# Patient Record
(demographics unavailable — no encounter records)

---

## 2025-01-08 NOTE — HISTORY OF PRESENT ILLNESS
[Patient reported mammogram was normal] : Patient reported mammogram was normal [Patient reported PAP Smear was normal] : Patient reported PAP Smear was normal [N] : Patient does not use contraception [Y] : Positive pregnancy history [Normal Amount/Duration] :  normal amount and duration [Regular Cycle Intervals] : periods have been regular [Frequency: Q ___ days] : menstrual periods occur approximately every [unfilled] days [Menarche Age: ____] : age at menarche was [unfilled] [Currently Active] : currently active [Men] : men [No] : No [Gonorrhea test offered] : Gonorrhea test offered [Chlamydia test offered] : Chlamydia test offered [Trichomonas test offered] : Trichomonas test offered [HPV test offered] : HPV test offered [TextBox_4] : 34-year-old para 1-0-1-1 with LMP 10/26/2024 came for annual GYN exam and Pap smear and to evaluate pregnancy from home pregnancy test positive.  Patient denies abnormal uterine bleeding, pelvic pain, discharge, dysuria or other GYN complaints.  Patient states she has been tolerating food and having increased appetite but denies any pain or adverse complications since LMP.  She has history of abnormal Pap but high risk HPV negative.  She also has a remote history of HSV but no recent outbreaks.  She denies other STDs, fibroids or ovarian cyst.  She is sexually active with 1 male partner-.  UCG positive U dip: Bilirubin, ketones, microscopic hematuria, protein 30  FOB: Same father as her current living child and her most recent pregnancy that resulted in SAB without testing.  He is healthy and then you have no significant family history of genetic disease. [Mammogramdate] : 7/2024 [PapSmeardate] : 3/2022 [LMPDate] : 10/26/24 [MensesFreq] : 28 [MensesLength] : 5-7 [MensesAmount] : mod [PGHxTotal] : 2 [[facility]erm] : 1 [PGHxPremature] : 0 [PGHxAbortions] : 1 [[facility] Centeriving] : 1 [PGHxABSpont] : 1 [FreeTextEntry1] : 10/26/24

## 2025-01-08 NOTE — HISTORY OF PRESENT ILLNESS
[Patient reported mammogram was normal] : Patient reported mammogram was normal [Patient reported PAP Smear was normal] : Patient reported PAP Smear was normal [N] : Patient does not use contraception [Y] : Positive pregnancy history [Normal Amount/Duration] :  normal amount and duration [Regular Cycle Intervals] : periods have been regular [Frequency: Q ___ days] : menstrual periods occur approximately every [unfilled] days [Menarche Age: ____] : age at menarche was [unfilled] [Currently Active] : currently active [Men] : men [No] : No [Gonorrhea test offered] : Gonorrhea test offered [Chlamydia test offered] : Chlamydia test offered [Trichomonas test offered] : Trichomonas test offered [HPV test offered] : HPV test offered [TextBox_4] : 34-year-old para 1-0-1-1 with LMP 10/26/2024 came for annual GYN exam and Pap smear and to evaluate pregnancy from home pregnancy test positive.  Patient denies abnormal uterine bleeding, pelvic pain, discharge, dysuria or other GYN complaints.  Patient states she has been tolerating food and having increased appetite but denies any pain or adverse complications since LMP.  She has history of abnormal Pap but high risk HPV negative.  She also has a remote history of HSV but no recent outbreaks.  She denies other STDs, fibroids or ovarian cyst.  She is sexually active with 1 male partner-.  UCG positive U dip: Bilirubin, ketones, microscopic hematuria, protein 30  FOB: Same father as her current living child and her most recent pregnancy that resulted in SAB without testing.  He is healthy and then you have no significant family history of genetic disease. [Mammogramdate] : 7/2024 [PapSmeardate] : 3/2022 [LMPDate] : 10/26/24 [MensesFreq] : 28 [MensesLength] : 5-7 [MensesAmount] : mod [PGHxTotal] : 2 [[facility] HospitalxFulerm] : 1 [PGHxPremature] : 0 [PGHxAbortions] : 1 [[facility] Centeriving] : 1 [PGHxABSpont] : 1 [FreeTextEntry1] : 10/26/24

## 2025-01-08 NOTE — COUNSELING
[Nutrition/ Exercise/ Weight Management] : nutrition, exercise, weight management [Vitamins/Supplements] : vitamins/supplements [Breast Self Exam] : breast self exam [Bladder Hygiene] : bladder hygiene [Contraception/ Emergency Contraception/ Safe Sexual Practices] : contraception, emergency contraception, safe sexual practices [Pregnancy Options] : pregnancy options [STD (testing, results, tx)] : STD (testing, results, tx) [Lab Results] : lab results [Medication Management] : medication management [Pre/Post Op Instructions] : pre/post op instructions

## 2025-01-08 NOTE — DISCUSSION/SUMMARY
[FreeTextEntry1] : Transvaginal ultrasound done to assess pregnancy viability and dating.  Patient and  were counseled that a gestational sac with questionable yolk sac was noted in the uterus but there was no fetal pole which would be expected with dating consistent with LMP.  There is no adnexal masses or findings or suggestion of ectopic and no free fluid.  Patient counseled on possible early IUP but concern for missed  and as she is asymptomatic recommend serial hCG and progesterone levels and will follow-up 48 hours after repeat labs for further management discussion and planning.  Strict pain and bleeding and infection precautions given.  Patient and  understood counseling and all questions answered satisfactorily.  A: 34-year-old for annual GYN exam, amenorrhea, incidental pregnancy-unknown viability, BMI 28  P: GYN exam done Pap smear done Transvaginal sonogram done to assess pregnancy viability-findings counseled hCG/progesterone and preop labs done Repeat labs in 48 hours Safe sex practices Pain, bleeding, and infection precautions counseled-patient knows recommendation for RhoGAM to be given if she starts bleeding due to Rh- status If recurrent SAB is diagnosed options of management were discussed including conservative, medical and surgical If recurrent SAB noted we discussed doing genetic testing for her and her  for future management planning Encourage healthy diet and activity as tolerated Follow-up after repeat labs or as needed

## 2025-01-08 NOTE — PHYSICAL EXAM
[Chaperone Declined] : Patient declined chaperone [Appropriately responsive] : appropriately responsive [Alert] : alert [No Acute Distress] : no acute distress [No Lymphadenopathy] : no lymphadenopathy [Regular Rate Rhythm] : regular rate rhythm [Soft] : soft [Non-tender] : non-tender [Non-distended] : non-distended [No Mass] : no mass [Oriented x3] : oriented x3 [Examination Of The Breasts] : a normal appearance [No Discharge] : no discharge [No Masses] : no breast masses were palpable [No Lesions] : no lesions  [Labia Majora] : normal [Labia Minora] : normal [Normal] : normal [No Bleeding] : There was no active vaginal bleeding [Uterine Adnexae] : normal [FreeTextEntry2] :  present for exam [Tenderness] : nontender [Enlarged ___ wks] : not enlarged [Mass ___ cm] : no uterine mass was palpated [FreeTextEntry5] : pap smear done Cervix closed and long

## 2025-01-13 NOTE — PLAN
[FreeTextEntry1] : TAUS: Sac like structure noted TVUS: intrauterine gestational sac visualized with multiple internal hyperechoic rings, with suspected failed twin pregnancy with possible single fetal pole without FH  A/P: 35yo with likely early embryonic demise.  - Given previous US and follow up bedside US today, likely early pregnancy loss discussed, however  given bedside US today with inconclusive findings, discussed recommendation for expectant management with repeat US in 1 week with early pregnancy loss precautions, vs official US today  - Patient agrees for official US today, order placed and US facilitated   ADDENDUM:  After US patient left clinic as she is scheduled for work before reviewing results with me. I called patient at the end of the day who affirmed that she discussed care with MFM team and primary Gyn, and affirmed she will follow up with primary gyn. Denies other questions or concerns at this time, patient may return to [facility] as needed.

## 2025-01-13 NOTE — PLAN
[FreeTextEntry1] : TAUS: Sac like structure noted TVUS: intrauterine gestational sac visualized with multiple internal hyperechoic rings, with suspected failed twin pregnancy with possible single fetal pole without FH  A/P: 35yo with likely early embryonic demise.  - Given previous US and follow up bedside US today, likely early pregnancy loss discussed, however  given bedside US today with inconclusive findings, discussed recommendation for expectant management with repeat US in 1 week with early pregnancy loss precautions, vs official US today  - Patient agrees for official US today, order placed and US facilitated   ADDENDUM:  After US patient left clinic as she is scheduled for work before reviewing results with me. I called patient at the end of the day who affirmed that she discussed care with MFM team and primary Gyn, and affirmed she will follow up with primary gyn. Denies other questions or concerns at this time, patient may return to [facility] as needed.

## 2025-01-13 NOTE — PHYSICAL EXAM
[Chaperone Present] : A chaperone was present in the examining room during all aspects of the physical examination [18100] : A chaperone was present during the pelvic exam. [Appropriately responsive] : appropriately responsive [Alert] : alert [No Acute Distress] : no acute distress [Soft] : soft [Non-tender] : non-tender [No Lesions] : no lesions [Oriented x3] : oriented x3 [Labia Majora] : normal [Normal] : normal [FreeTextEntry2] : CONCEPCIÓN Rangel

## 2025-01-13 NOTE — END OF VISIT
[FreeTextEntry3] :  I spent 40 minutes caring for this patient, including time spent before the visit reviewing the chart, time spent during the visit, and time spent after the visit on documentation, excluding all procedures including ultrasounds.  [Time Spent: ___ minutes] : I have spent [unfilled] minutes of time on the encounter which excludes teaching and separately reported services.

## 2025-01-13 NOTE — PHYSICAL EXAM
[Chaperone Present] : A chaperone was present in the examining room during all aspects of the physical examination [88821] : A chaperone was present during the pelvic exam. [Appropriately responsive] : appropriately responsive [Alert] : alert [No Acute Distress] : no acute distress [Soft] : soft [Non-tender] : non-tender [No Lesions] : no lesions [Oriented x3] : oriented x3 [Labia Majora] : normal [Normal] : normal [FreeTextEntry2] : CONCEPCIÓN Rangel

## 2025-01-13 NOTE — HISTORY OF PRESENT ILLNESS
[FreeTextEntry1] : Patient presenting today for evaluation for desired pregnancy with uncertain viability.  She reports that she was offered misoprostol for management of early pregnancy loss, but since she did not have symptoms of pregnancy loss, and continues to have symptoms of pregnancy, she desired expectant management and repeat imaging.   12/31/24  Hg 14.1 HCG 99976 1/3/25 HCG 46473

## 2025-01-13 NOTE — HISTORY OF PRESENT ILLNESS
[FreeTextEntry1] : Patient presenting today for evaluation for desired pregnancy with uncertain viability.  She reports that she was offered misoprostol for management of early pregnancy loss, but since she did not have symptoms of pregnancy loss, and continues to have symptoms of pregnancy, she desired expectant management and repeat imaging.   12/31/24  Hg 14.1 HCG 50491 1/3/25 HCG 75360

## 2025-01-23 NOTE — HISTORY OF PRESENT ILLNESS
[Normal Amount/Duration] :  normal amount and duration [Regular Cycle Intervals] : periods have been regular [Frequency: Q ___ days] : menstrual periods occur approximately every [unfilled] days [Menarche Age: ____] : age at menarche was [unfilled] [Currently Active] : currently active [Men] : men [No] : No [Patient reported PAP Smear was abnormal] : Patient reported PAP Smear was abnormal [TextBox_4] : 34-year-old para 1-0-2-1 status post suction D&C for missed AB came for postop exam.  Patient denies any residual vaginal bleeding or pelvic cramping and is feeling well.  Pathology was reviewed however still awaiting the Delphine results to evaluate fetal tissue for potential genetic anomaly.  Due to patient having to spontaneous abortions she would like hereditary carrier testing to evaluate for any possible abnormalities to explain miscarriages.  Patient counseled if she is positive for any carrier status it would be recommended to check her .  Patient would like to do testing today.  Patient was also counseled that her recent Pap smear showed high risk HPV positive however cells were negative and would recommend repeat Pap smear in 6 months.  Patient understood all counseling all questions answered satisfactorily and patient is amenable to management planning.  Preconception counseling was done and patient encouraged to have regular menstrual cycles before attempting pregnancy again.  Patient understands but declines any contraception. [PapSmeardate] : 12/2024 [TextBox_31] : High risk HPV positive, cells negative [FreeTextEntry1] : 10/26/24

## 2025-01-23 NOTE — DISCUSSION/SUMMARY
[FreeTextEntry1] : A: 34-year-old status post D&C for treatment of her missed , recurrent miscarriage,  high risk HPV, BMI 29  P: Postop exam done Results reviewed and discussed-awaiting fetal tissue genetic results Safe sex practices Pain and bleeding precautions Contraception counseling done-patient wants nothing at this time Procreative carrier gene test counseling done-[employer] panel sent Encourage healthy diet and exercise Follow-up 6 months for repeat Pap or as needed

## 2025-01-23 NOTE — COUNSELING
[Nutrition/ Exercise/ Weight Management] : nutrition, exercise, weight management [Vitamins/Supplements] : vitamins/supplements [Bladder Hygiene] : bladder hygiene [Contraception/ Emergency Contraception/ Safe Sexual Practices] : contraception, emergency contraception, safe sexual practices [Preconception Care/ Fertility options] : preconception care, fertility options [Lab Results] : lab results [Medication Management] : medication management

## 2025-01-23 NOTE — PHYSICAL EXAM
[Chaperone Declined] : Patient declined chaperone [Appropriately responsive] : appropriately responsive [Alert] : alert [No Acute Distress] : no acute distress [Regular Rate Rhythm] : regular rate rhythm [Soft] : soft [Non-tender] : non-tender [Non-distended] : non-distended [No Mass] : no mass [Oriented x3] : oriented x3 [No Lesions] : no lesions  [Labia Majora] : normal [Labia Minora] : normal [No Bleeding] : There was no active vaginal bleeding [Normal] : normal [Uterine Adnexae] : normal [Tenderness] : nontender [Enlarged ___ wks] : not enlarged [Mass ___ cm] : no uterine mass was palpated

## 2025-05-02 NOTE — HISTORY OF PRESENT ILLNESS
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 She denies any fevers at home, appears comfortably, speaking comfortably, no evident sob. Tried allergy meds at home w no releif

## 2025-05-02 NOTE — ASSESSMENT
[FreeTextEntry1] : #bronchitis likely viral R/O Mycoplasma pt has a 2 year old at home - biaxin 500 q12 x 10d  # HTN /108 Start HCTZ 25 mg po daily Low salt diet FU in 3 days

## 2025-05-02 NOTE — ADDENDUM
[FreeTextEntry1] : Patients BP taken twice : 145/106 and 148/108 Hx of elevated BPps. Not on meds. Has Occasional Headaches

## 2025-06-13 NOTE — HISTORY OF PRESENT ILLNESS
[Amenorrhea] : amenorrhea [Y] : Positive pregnancy history [Normal Amount/Duration] :  normal amount and duration [Regular Cycle Intervals] : periods have been regular [Frequency: Q ___ days] : menstrual periods occur approximately every [unfilled] days [Menarche Age: ____] : age at menarche was [unfilled] [HCG+: ___(Date)] : a positive HCG was recorded on [unfilled] [Currently Active] : currently active [Men] : men [No] : No [TextBox_4] : 35-year-old para 1-0-1-1 with LMP -4/26/25 came for annual GYN exam and Pap smear and to evaluate pregnancy from home pregnancy test positive. Patient denies abnormal uterine bleeding, pelvic pain, discharge, dysuria or other GYN complaints. Patient states she has been tolerating food and having increased appetite but denies any pain or adverse complications since LMP. She has history of abnormal Pap but high risk HPV negative. She also has a remote history of HSV but no recent outbreaks. She denies other STDs, fibroids or ovarian cyst. She is sexually active with 1 male partner-.  UCG positive [LMPDate] : 4/26/25 [MensesFreq] : 28 [MensesLength] : 5 [MensesAmount] : mod [PGHxTotal] : 3 [[facility]xFulerm] : 1 [PGHxAbortions] : 1 [PGHxABSpont] : 1 [FreeTextEntry1] : 4/26/25

## 2025-06-13 NOTE — PHYSICAL EXAM
[Chaperone Declined] : Chaperone offered however refused by patient, [Appropriately responsive] : appropriately responsive [Alert] : alert [No Acute Distress] : no acute distress [No Lymphadenopathy] : no lymphadenopathy [Regular Rate Rhythm] : regular rate rhythm [Soft] : soft [Non-tender] : non-tender [Non-distended] : non-distended [No HSM] : No HSM [No Mass] : no mass [Oriented x3] : oriented x3 [No Lesions] : no lesions  [Labia Majora] : normal [Labia Minora] : normal [No Bleeding] : There was no active vaginal bleeding [Normal] : normal [Uterine Adnexae] : normal [Tenderness] : nontender [Enlarged ___ wks] : not enlarged [Mass ___ cm] : no uterine mass was palpated

## 2025-06-13 NOTE — COUNSELING
[Nutrition/ Exercise/ Weight Management] : nutrition, exercise, weight management [Vitamins/Supplements] : vitamins/supplements [Contraception/ Emergency Contraception/ Safe Sexual Practices] : contraception, emergency contraception, safe sexual practices [Pregnancy Options] : pregnancy options [Lab Results] : lab results [Medication Management] : medication management

## 2025-06-13 NOTE — HISTORY OF PRESENT ILLNESS
[Amenorrhea] : amenorrhea [Y] : Positive pregnancy history [Normal Amount/Duration] :  normal amount and duration [Regular Cycle Intervals] : periods have been regular [Frequency: Q ___ days] : menstrual periods occur approximately every [unfilled] days [Menarche Age: ____] : age at menarche was [unfilled] [HCG+: ___(Date)] : a positive HCG was recorded on [unfilled] [Currently Active] : currently active [Men] : men [No] : No [TextBox_4] : 35-year-old para 1-0-1-1 with LMP -4/26/25 came for annual GYN exam and Pap smear and to evaluate pregnancy from home pregnancy test positive. Patient denies abnormal uterine bleeding, pelvic pain, discharge, dysuria or other GYN complaints. Patient states she has been tolerating food and having increased appetite but denies any pain or adverse complications since LMP. She has history of abnormal Pap but high risk HPV negative. She also has a remote history of HSV but no recent outbreaks. She denies other STDs, fibroids or ovarian cyst. She is sexually active with 1 male partner-.  UCG positive [LMPDate] : 4/26/25 [MensesFreq] : 28 [MensesLength] : 5 [PGHxTotal] : 3 [MensesAmount] : mod [[facility] CampusxFulerm] : 1 [PGHxAbortions] : 1 [PGHxABSpont] : 1 [FreeTextEntry1] : 4/26/25

## 2025-06-13 NOTE — DISCUSSION/SUMMARY
[FreeTextEntry1] : A: 35-year-old for annual GYN exam, amenorrhea, incidental pregnancy-unknown viability, BMI 28  P: GYN exam done Transvaginal sonogram done to assess pregnancy viability-findings counseled hCG/progesterone and preop labs done Repeat labs in 48 hours Safe sex practices Pain, bleeding, and infection precautions counseled-patient knows recommendation for RhoGAM to be given if she starts bleeding due to Rh- status If recurrent SAB is diagnosed options of management were discussed including conservative, medical and surgical If recurrent SAB noted we discussed doing genetic testing for her and her  for future management planning Encourage healthy diet and activity as tolerated Follow-up after repeat labs or as needed.

## 2025-07-09 NOTE — DISCUSSION/SUMMARY
[FreeTextEntry1] : A: 35-year-old with amenorrhea, incidental pregnancy, chronic hypertension, BMI 27  P: GYN exam done Transvaginal sonogram done to assess pregnancy viability-findings counseled Labs reviewed and discussed Vaginal progesterone Rx given Safe sex practices Pain, bleeding, and infection precautions counseled-patient knows recommendation for RhoGAM to be given if she starts bleeding due to Rh- status Continue BP monitoring and labetalol as prescribed Encourage healthy diet and activity as tolerated Follow-up 3 weeks for repeat evaluation and to start prenatal care or as needed.

## 2025-07-09 NOTE — PHYSICAL EXAM
[Chaperone Declined] : Chaperone offered however refused by patient, [Appropriately responsive] : appropriately responsive [Alert] : alert [No Acute Distress] : no acute distress [No Lymphadenopathy] : no lymphadenopathy [Regular Rate Rhythm] : regular rate rhythm [Soft] : soft [Non-tender] : non-tender [Non-distended] : non-distended [No Mass] : no mass [Oriented x3] : oriented x3 [No Lesions] : no lesions  [Labia Majora] : normal [Labia Minora] : normal [Normal] : normal [No Bleeding] : There was no active vaginal bleeding [Uterine Adnexae] : normal [Tenderness] : nontender [Enlarged ___ wks] : not enlarged [Mass ___ cm] : no uterine mass was palpated [FreeTextEntry5] : pap smear done

## 2025-07-09 NOTE — HISTORY OF PRESENT ILLNESS
[Normal Amount/Duration] :  normal amount and duration [Regular Cycle Intervals] : periods have been regular [Frequency: Q ___ days] : menstrual periods occur approximately every [unfilled] days [Menarche Age: ____] : age at menarche was [unfilled] [HCG+: ___(Date)] : a positive HCG was recorded on [unfilled] [Currently Active] : currently active [Men] : men [No] : No [TextBox_4] : 35-year-old para 1-0-1-1 with LMP -4/26/25 came to evaluate pregnancy from home pregnancy test positive. Patient had went for serial hCG and progesterone levels as she has had 2 previous miscarriages last resulting in suction D&C. Patient's hCG did rise borderline normal and the progesterone dropped but still within normal range. Patient is concerned about another miscarriage and possible need for progesterone supplementation. She denies vaginal bleeding, pelvic pain, discharge or dysuria. She was trying to conceive and on contraception and is sexually active with 1 male partner-. Same FOB as her first child.  [FreeTextEntry1] : 4/26/25

## 2025-07-10 NOTE — OB SUMMARY
[Date: _____] : Date: [unfilled] [Gestational Age: _____] : Gestational Age: [unfilled] [FreeTextEntry1] : Pt is here for her  9 week OB check-up, pt has no complaints today. [de-identified] : sm

## 2025-07-10 NOTE — OB SUMMARY
[Date: _____] : Date: [unfilled] [Gestational Age: _____] : Gestational Age: [unfilled] [FreeTextEntry1] : Pt is here for her  9 week OB check-up, pt has no complaints today. [de-identified] : sm

## 2025-07-10 NOTE — OB SUMMARY
[Date: _____] : Date: [unfilled] [Gestational Age: _____] : Gestational Age: [unfilled] [FreeTextEntry1] : Pt is here for her  9 week OB check-up, pt has no complaints today. [de-identified] : sm

## 2025-07-15 NOTE — OB SUMMARY
[Date: _____] : Date: [unfilled] [Gestational Age: _____] : Gestational Age: [unfilled] [FreeTextEntry1] : Pt is here for her 11 week OB check-up, pt has no complaints today.  wt- 160 ht- 5 '4 temp- 98.6 b/p- 110/86 pulse- 80 pro- neg glu- neg [de-identified] : sm

## 2025-07-15 NOTE — OB SUMMARY
[Date: _____] : Date: [unfilled] [Gestational Age: _____] : Gestational Age: [unfilled] [FreeTextEntry1] : Pt is here for her 11 week OB check-up, pt has no complaints today.  wt- 160 ht- 5 '4 temp- 98.6 b/p- 110/86 pulse- 80 pro- neg glu- neg [de-identified] : sm

## 2025-07-15 NOTE — HISTORY OF PRESENT ILLNESS
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  Hx of miscarriages x 2, now on ASA.  Pt is seen S/p ER visit at [facility] on 7/8/25 due to SVT episode. Had HR at 192, narrow complex, c/w AVNRT, broke with adenosine injection.  Pt never had cardiac testing.  Pt denies chest pain, has SOB with exertion, no edema, has occasional palpitations. 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   7/05/25 HgbA1C 5 TSH 1.89

## 2025-07-15 NOTE — ASSESSMENT
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 No further events.  PSVT discussed the arrhythmia At this time not a candidate for RFA, will re-assess after she completes her pregnancy.  Vagal maneuvers discussed Increase labetalol to BID - Rx sent Echo today to evaluate LV function, r/o valvular disease  Will follow